# Patient Record
Sex: MALE | Race: WHITE | Employment: STUDENT | URBAN - METROPOLITAN AREA
[De-identification: names, ages, dates, MRNs, and addresses within clinical notes are randomized per-mention and may not be internally consistent; named-entity substitution may affect disease eponyms.]

---
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
## 2025-02-04 ENCOUNTER — OFFICE VISIT (OUTPATIENT)
Dept: INTERNAL MEDICINE CLINIC | Facility: CLINIC | Age: 20
End: 2025-02-04
Payer: COMMERCIAL

## 2025-02-04 VITALS
HEART RATE: 92 BPM | OXYGEN SATURATION: 100 % | BODY MASS INDEX: 19.77 KG/M2 | DIASTOLIC BLOOD PRESSURE: 87 MMHG | HEIGHT: 73 IN | WEIGHT: 149.19 LBS | SYSTOLIC BLOOD PRESSURE: 144 MMHG

## 2025-02-04 DIAGNOSIS — F43.10 PTSD (POST-TRAUMATIC STRESS DISORDER): ICD-10-CM

## 2025-02-04 DIAGNOSIS — Z00.00 ANNUAL PHYSICAL EXAM: Primary | ICD-10-CM

## 2025-02-04 DIAGNOSIS — L70.0 ACNE VULGARIS: ICD-10-CM

## 2025-02-04 DIAGNOSIS — F90.2 ATTENTION DEFICIT HYPERACTIVITY DISORDER (ADHD), COMBINED TYPE: ICD-10-CM

## 2025-02-04 PROCEDURE — 99385 PREV VISIT NEW AGE 18-39: CPT | Performed by: INTERNAL MEDICINE
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
## 2025-02-04 NOTE — PROGRESS NOTES
The following individual(s) verbally consented to be recorded using ambient AI listening technology and understand that they can each withdraw their consent to this listening technology at any point by asking the clinician to turn off or pause the recording: yes

## 2025-02-04 NOTE — PATIENT INSTRUCTIONS
Prevention Guidelines, Men Ages 18 to 39  Screening tests and vaccines are an important part of managing your health. A screening test is done to find possible disorders or diseases in people who don't have any symptoms. The goal is to find a disease early so lifestyle changes can be made and you can be watched more closely to reduce the risk of disease, or to detect it early enough to treat it most effectively. Screening tests are not considered diagnostic, but are used to determine if more testing is needed. Health counseling is essential, too. Below are guidelines for these, for men ages 18 to 39. Talk with your healthcare provider to make sure you’re up-to-date on what you need.  Screening Who needs it How often   Alcohol misuse All men in this age group At routine exams   Blood pressure All men in this age group Yearly checkup if your blood pressure is normal  Normal blood pressure is less than 120/80  If your blood pressure is higher than normal, follow the advice of your healthcare provider.     All men in this age group At routine exams   Diabetes mellitus, type 2 All men who have no symptoms but are overweight or obese and have 1 or more other risk factors for diabetes At least every 3 years (yearly if blood sugar has already started to rise)   Diabetes mellitus, type 2  All men with prediabetes Every year   Hepatitis C If at increased risk At routine exams   High cholesterol or triglycerides All men ages 35 and older, and younger men at high risk for coronary artery disease At least every 5 years   HIV All men At routine exams   Obesity All men in this age group At routine exams   Syphilis Men at increased risk for infection - talk with your healthcare provider At routine exams   Tuberculosis Men at increased risk for infection - talk with your healthcare provider Check with your healthcare provider   Vision All men in this age group Every 5 to 10 years if no risk factors for eye disease   Vaccines Who  needs it How often   Chickenpox (varicella) All men in this age group who have no record of this infection or vaccine 2 doses; the second dose should be given at least 4 weeks after the first dose   Hepatitis A Men at increased risk for infection - talk with your healthcare provider 2 doses given at least 6 months apart   Hepatitis B Men at increased risk for infection - talk with your healthcare provider 3 doses over 6 months; second dose should be given 1 month after the first dose; the third dose should be given at least 2 months after the second dose and at least 4 months after the first dose   Haemophilus influenzae Type B (HIB) Men at increased risk for infection - talk with your healthcare provider 1 to 3 doses   Human papillomavirus (HPV) All men in this age group up to age 26 3 doses; the second dose should be given 1 to 2 months after the first dose and the third dose given 6 months after the first dose   Influenza (flu) All men in this age group Once a year   Measles, mumps, rubella (MMR) All men in this age group who have no record of these infections or vaccines 1 or 2 doses through age 55   Meningococcal Men at increased risk for infection - talk with your healthcare provider 1 or more doses   Pneumococca (PCV13) and Pneumococcal (PPSV23) Men at increased risk for infection - talk with your healthcare provider PCV13: 1 dose ages 19 to 65 (protects against 13 types of pneumococcal bacteria)  PPSV23: 1 to 2 doses through age 64, or 1 dose at 65 or older (protects against 23 types of pneumococcal bacteria)   Tetanus/diphtheria/pertussis (Td/Tdap) booster All men in this age group A one-time Tdap booster after age 18, then Td every10 years   Counseling Who needs it How often   Diet and exercise Overweight or obese people When diagnosed, and then at routine exams   Use of tobacco and the health effects it can cause All men in this age group Every visit   Sexually transmitted infection prevention Men who are  sexually active At routine exams   Skin cancer Prevention of skin cancer in fair-skinned adults through age 24 At routine exams   1Those who are 18 years of age, who are not up-to-date on their childhood immunizations, should receive all appropriate catch-up vaccines recommended by the CDC.  Date Last Reviewed: 2/1/2017  © 6093-1647 [employer]. [address] [place] [address]. All rights reserved. This information is not intended as a substitute for professional medical care. Always follow your healthcare professional's instructions.

## 2025-03-14 ENCOUNTER — LAB ENCOUNTER (OUTPATIENT)
Dept: LAB | Age: 20
End: 2025-03-14
Attending: INTERNAL MEDICINE
Payer: COMMERCIAL

## 2025-03-14 ENCOUNTER — TELEPHONE (OUTPATIENT)
Dept: INTERNAL MEDICINE CLINIC | Facility: CLINIC | Age: 20
End: 2025-03-14

## 2025-03-14 DIAGNOSIS — Z23 NEED FOR MENINGOCOCCAL VACCINATION: Primary | ICD-10-CM

## 2025-03-14 DIAGNOSIS — Z00.00 ANNUAL PHYSICAL EXAM: ICD-10-CM

## 2025-03-14 LAB
ALBUMIN SERPL-MCNC: 5 G/DL (ref 3.2–4.8)
ALBUMIN/GLOB SERPL: 1.9 {RATIO} (ref 1–2)
ALP LIVER SERPL-CCNC: 66 U/L
ALT SERPL-CCNC: 21 U/L
ANION GAP SERPL CALC-SCNC: 8 MMOL/L (ref 0–18)
AST SERPL-CCNC: 28 U/L (ref ?–34)
BASOPHILS # BLD AUTO: 0.02 X10(3) UL (ref 0–0.2)
BASOPHILS NFR BLD AUTO: 0.6 %
BILIRUB SERPL-MCNC: 0.7 MG/DL (ref 0.3–1.2)
BUN BLD-MCNC: 13 MG/DL (ref 9–23)
BUN/CREAT SERPL: 13.3 (ref 10–20)
CALCIUM BLD-MCNC: 9.9 MG/DL (ref 8.7–10.4)
CHLORIDE SERPL-SCNC: 103 MMOL/L (ref 98–112)
CHOLEST SERPL-MCNC: 117 MG/DL (ref ?–200)
CO2 SERPL-SCNC: 27 MMOL/L (ref 21–32)
CREAT BLD-MCNC: 0.98 MG/DL
DEPRECATED RDW RBC AUTO: 34 FL (ref 35.1–46.3)
EGFRCR SERPLBLD CKD-EPI 2021: 114 ML/MIN/1.73M2 (ref 60–?)
EOSINOPHIL # BLD AUTO: 0.06 X10(3) UL (ref 0–0.7)
EOSINOPHIL NFR BLD AUTO: 1.7 %
ERYTHROCYTE [DISTWIDTH] IN BLOOD BY AUTOMATED COUNT: 11.8 % (ref 11–15)
FASTING PATIENT LIPID ANSWER: YES
FASTING STATUS PATIENT QL REPORTED: YES
GLOBULIN PLAS-MCNC: 2.6 G/DL (ref 2–3.5)
GLUCOSE BLD-MCNC: 107 MG/DL (ref 70–99)
HCT VFR BLD AUTO: 41.8 %
HDLC SERPL-MCNC: 43 MG/DL (ref 40–59)
HGB BLD-MCNC: 14.8 G/DL
IMM GRANULOCYTES # BLD AUTO: 0.01 X10(3) UL (ref 0–1)
IMM GRANULOCYTES NFR BLD: 0.3 %
LDLC SERPL CALC-MCNC: 59 MG/DL (ref ?–100)
LYMPHOCYTES # BLD AUTO: 1.35 X10(3) UL (ref 1.5–5)
LYMPHOCYTES NFR BLD AUTO: 37.4 %
MCH RBC QN AUTO: 28.7 PG (ref 26–34)
MCHC RBC AUTO-ENTMCNC: 35.4 G/DL (ref 31–37)
MCV RBC AUTO: 81 FL
MONOCYTES # BLD AUTO: 0.64 X10(3) UL (ref 0.1–1)
MONOCYTES NFR BLD AUTO: 17.7 %
NEUTROPHILS # BLD AUTO: 1.53 X10 (3) UL (ref 1.5–7.7)
NEUTROPHILS # BLD AUTO: 1.53 X10(3) UL (ref 1.5–7.7)
NEUTROPHILS NFR BLD AUTO: 42.3 %
NONHDLC SERPL-MCNC: 74 MG/DL (ref ?–130)
OSMOLALITY SERPL CALC.SUM OF ELEC: 287 MOSM/KG (ref 275–295)
PLATELET # BLD AUTO: 215 10(3)UL (ref 150–450)
POTASSIUM SERPL-SCNC: 4 MMOL/L (ref 3.5–5.1)
PROT SERPL-MCNC: 7.6 G/DL (ref 5.7–8.2)
RBC # BLD AUTO: 5.16 X10(6)UL
SODIUM SERPL-SCNC: 138 MMOL/L (ref 136–145)
TRIGL SERPL-MCNC: 75 MG/DL (ref 30–149)
TSI SER-ACNC: 1.86 UIU/ML (ref 0.48–4.17)
VLDLC SERPL CALC-MCNC: 11 MG/DL (ref 0–30)
WBC # BLD AUTO: 3.6 X10(3) UL (ref 4–11)

## 2025-03-14 PROCEDURE — 80053 COMPREHEN METABOLIC PANEL: CPT

## 2025-03-14 PROCEDURE — 36415 COLL VENOUS BLD VENIPUNCTURE: CPT

## 2025-03-14 PROCEDURE — 80061 LIPID PANEL: CPT

## 2025-03-14 PROCEDURE — 85025 COMPLETE CBC W/AUTO DIFF WBC: CPT

## 2025-03-14 PROCEDURE — 84443 ASSAY THYROID STIM HORMONE: CPT

## 2025-03-14 NOTE — TELEPHONE ENCOUNTER
Patient walked into the [facility] office states he received a message he is due for his pneumococcal vaccine    No orders in chart     Please call patient with status

## 2025-04-13 ENCOUNTER — PATIENT MESSAGE (OUTPATIENT)
Dept: INTERNAL MEDICINE CLINIC | Facility: CLINIC | Age: 20
End: 2025-04-13

## 2025-04-18 ENCOUNTER — NURSE ONLY (OUTPATIENT)
Dept: INTERNAL MEDICINE CLINIC | Facility: CLINIC | Age: 20
End: 2025-04-18

## 2025-04-18 DIAGNOSIS — Z23 NEED FOR MENINGOCOCCAL VACCINATION: Primary | ICD-10-CM

## 2025-04-18 PROCEDURE — 90471 IMMUNIZATION ADMIN: CPT | Performed by: INTERNAL MEDICINE

## 2025-04-18 PROCEDURE — 90620 MENB-4C VACCINE IM: CPT | Performed by: INTERNAL MEDICINE

## 2025-04-18 NOTE — PROGRESS NOTES
Pt presented for nurse visit for Meningococcal vaccine, confirmed orders, confirmed pts name and , administered vaccine on left deltoid, pt had no adverse reactions, provided vaccine information sheet to pt, let pt know to call us if he had any questions or concerns, pt verbalized understanding

## 2025-08-12 ENCOUNTER — NURSE TRIAGE (OUTPATIENT)
Dept: INTERNAL MEDICINE CLINIC | Facility: CLINIC | Age: 20
End: 2025-08-12

## (undated) NOTE — LETTER
No referring provider defined for this encounter. 04/19/19        Patient: Stevenson Giles   YOB: 2005   Date of Visit: 4/19/2019       Dear  Dr. Chris Brice, DO,      Thank you for referring Stevenson Giles to my practice.   Please find m

## (undated) NOTE — LETTER
[facility]                                      Department of Human Services                                   Certificate of Child Health Examination       Student's Name  Dillon Salazar Birth Date  10/30/2005  Sex  Male Race/Ethnicity   School/Grade Level/ID#      Address  [address] [place] [address] [place] [facility] [address] Parent/Guardian      Telephone# - Home   Telephone# - Work                              IMMUNIZATIONS:  To be completed by health care provider.  The mo/da/yr for every dose administered is required.  If a specific vaccine is medically contraindicated, a separate written statement must be attached by the health care provider responsible for completing the health examination explaining the medical reason for the contradiction.   VACCINE/DOSE DATE DATE DATE DATE DATE   Diphtheria, Tetanus and Pertussis (DTP or DTap) 1/11/2006 3/1/2006 5/1/2006 2/8/2007 10/28/2010   Tdap 6/15/2017       Td        Pediatric DT        Inactivate Polio (IPV) 1/11/2006 1/24/2006 3/1/2006 10/28/2010    Oral Polio (OPV)        Haemophilus Influenza Type B (Hib) 1/11/2006 3/1/2006 5/1/2006     Hepatitis B (HB) 10/31/2005 12/7/2005 7/24/2006     Varicella (Chickenpox) 10/30/2006 6/20/2011      Combined Measles, Mumps and Rubella (MMR) 10/30/2006 6/20/2011      Measles (Rubeola)        Rubella (3-day measles)        Mumps        Pneumococcal 1/11/2006 3/1/2006 5/1/2006 2/8/2007    Meningococcal Conjugate 6/15/2017 1/12/2022         RECOMMENDED, BUT NOT REQUIRED  Vaccine/Dose        VACCINE/DOSE DATE DATE DATE DATE DATE DATE   Hepatitis A 10/29/2007 6/20/2011       HPV 6/15/2017 1/19/2018       Influenza 10/27/2017 10/29/2018 11/4/2019 12/9/2020 1/12/2022 1/11/2023   Men B 1/10/2024        Covid 5/18/2021 6/8/2021 1/16/2022         Other:  Specify Immunization/Adminstered Dates:   Health care provider (MD, DO, APN, PA , school health professional) verifying above immunization  history must sign below.  Signature   ***                                                                                                                                     Title                           Date  1/10/2024   Signature                                                                                                                                              Title                           Date    (If adding dates to the above immunization history section, put your initials by date(s) and sign here.)   ALTERNATIVE PROOF OF IMMUNITY   1.Clinical diagnosis (measles, mumps, hepatits B) is allowed when verified by physician & supported with lab confirmation. Attach copy of lab result.       *MEASLES (Rubeola)  MO/DA/YR        * MUMPS MO/DA/YR       HEPATITIS B   MO/DA/YR        VARICELLA MO/DA/YR           2.  History of varicella (chickenpox) disease is acceptable if verified by health care provider, school health professional, or health official.       Person signing below is verifying  parent/guardian’s description of varicella disease is indicative of past infection and is accepting such hx as documentation of disease.       Date of Disease                                  Signature                                                                         Title                           Date             3.  Lab Evidence of Immunity (check one)    __Measles*       __Mumps *       __Rubella        __Varicella      __Hepatitis B       *Measles diagnosed on/after 7/1/2002 AND mumps diagnosed on/after 7/1/2013 must be confirmed by laboratory evidence   Completion of Alternatives 1 or 3 MUST be accompanied by Labs & Physician Signature:  Physician Statements of Immunity MUST be submitted to [facility] for review.   Certificates of [employer] Exemption to Immunizations or Physician Medical Statements of Medical Contraindication are Reviewed and Maintained by the School Authority.           Student's Name  Marie  Dillon BALTAZAR Birth Date  10/30/2005  Sex  Male School   Grade Level/ID#  ***   HEALTH HISTORY          TO BE COMPLETED AND SIGNED BY PARENT/GUARDIAN AND VERIFIED BY HEALTH CARE PROVIDER    ALLERGIES  (Food, drug, insect, other)  Patient has no known allergies. MEDICATION  (List all prescribed or taken on a regular basis.)     Diagnosis of asthma?  Child wakes during the night coughing   Yes   No    Yes   No    Loss of function of one of paired organs? (eye/ear/kidney/testicle)   Yes   No      Birth Defects?  Developmental delay?   Yes   No    Yes   No  Hospitalizations?  When?  What for?   Yes   No    Blood disorders?  Hemophilia, Sickle Cell, Other?  Explain.   Yes   No  Surgery?  (List all.)  When?  What for?   Yes   No    Diabetes?   Yes   No  Serious injury or illness?   Yes   No    Head Injury/Concussion/Passed out?   Yes   No  TB skin text positive (past/present)?   Yes   No *If yes, refer to local    Seizures?  What are they like?   Yes   No  TB disease (past or present)?   Yes   No *health department   Heart problem/Shortness of breath?   Yes   No  Tobacco use (type, frequency)?   Yes   No    Heart murmur/High blood pressure?   Yes   No  Alcohol/Drug use?   Yes   No    Dizziness or chest pain with exercise?   Yes   No  Fam hx sudden death < age 50 (Cause?)    Yes   No    Eye/Vision problems?  Yes  No   Glasses  Yes   No  Contacts  Yes    No   Last eye exam___  Other concerns? (crossed eye, drooping lids, squinting, difficulty reading) Dental:  ____Braces    ____Bridge    ____Plate    ____Other  Other concerns?     Ear/Hearing problems?   Yes   No  Information may be shared with appropriate personnel for health /educational purposes.   Bone/Joint problem/injury/scoliosis?   Yes   No  Parent/Guardian Signature                                          Date     PHYSICAL EXAMINATION REQUIREMENTS    Entire section below to be completed by MD/DO/APN/PA       PHYSICAL EXAMINATION REQUIREMENTS (head circumference if <2-3  years old):   There were no vitals taken for this visit.    DIABETES SCREENING  BMI>85% age/sex  No And any two of the following:  Family History No    Ethnic Minority  No          Signs of Insulin Resistance (hypertension, dyslipidemia, polycystic ovarian syndrome, acanthosis nigricans)    No           At Risk  No   Lead Risk Questionnaire  Req'd for children 6 months thru 6 yrs enrolled in licensed or public school operated day care, ,  nursery school and/or  (blood test req’d if resides in [facility] or high risk zip)   Questionnaire Administered:Yes   Blood Test Indicated:No   Blood Test Date                 Result:                 TB Skin OR Blood Test   Rec.only for children in high-risk groups incl. children immunosuppressed due to HIV infection or other conditions, frequent travel to or born in high prevalence countries or those exposed to adults in high-risk categories.  See CDCguidelines.  http://www.cdc.gov/tb/publications/factsheets/testing/TB_testing.htm.      No Test Needed        Skin Test:     Date Read                  /      /              Result:                     mm    ______________                         Blood Test:   Date Reported          /      /              Result:                  Value ______________               LAB TESTS (Recommended) Date Results  Date Results   Hemoglobin or Hematocrit   Sickle Cell  (when indicated)     Urinalysis   Developmental Screening Tool     SYSTEM REVIEW Normal Comments/Follow-up/Needs  Normal Comments/Follow-up/Needs   Skin Yes  Endocrine Yes    Ears Yes                      Screen result: Gastrointestinal Yes    Eyes Yes     Screen result:   Genito-Urinary Yes  LMP   Nose Yes  Neurological Yes    Throat Yes  Musculoskeletal Yes    Mouth/Dental Yes  Spinal examination Yes    Cardiovascular/HTN Yes  Nutritional status Yes    Respiratory Yes                   Diagnosis of Asthma: No Mental Health Yes        Currently Prescribed Asthma  Medication:            Quick-relief  medication (e.g. Short Acting Beta Antagonist): No          Controller medication (e.g. inhaled corticosteroid):   No Other   NEEDS/MODIFICATIONS required in the school setting  None DIETARY Needs/Restrictions     None   SPECIAL INSTRUCTIONS/DEVICES e.g. safety glasses, glass eye, chest protector for arrhythmia, pacemaker, prosthetic device, dental bridge, false teeth, athleticsupport/cup     None   MENTAL HEALTH/OTHER   Is there anything else the school should know about this student?  No  If you would like to discuss this student's health with school or school health professional, check title:  __Nurse  __Teacher  __Counselor  __Principal   EMERGENCY ACTION  needed while at school due to child's health condition (e.g., seizures, asthma, insect sting, food, peanut allergy, bleeding problem, diabetes, heart problem)?  No  If yes, please describe.     On the basis of the examination on this day, I approve this child's participation in        (If No or Modified, please attach explanation.)  PHYSICAL EDUCATION    Yes      INTERSCHOLASTIC SPORTS   Yes   Physician/Advanced Practice Nurse/Physician Assistant performing examination  Print Name  Andres Roberts MD                                            Signature   ***                                                                                    Date  1/10/2024     Address/Phone  [facility] [address]  [phone]   Rev 11/15                                                                    Printed by the Authority of the [facility]

## (undated) NOTE — LETTER
[facility] [employer] of Child Health Examination       Student's Name  Mikaela Jaimes Birth [birth year] Signature                                 . .                                                                                   Title                           Date  11/4/2019   Signature Grade Level/ID#  9th Grade   HEALTH HISTORY          TO BE COMPLETED AND SIGNED BY PARENT/GUARDIAN AND VERIFIED BY HEALTH CARE PROVIDER    ALLERGIES  (Food, drug, insect, other)  Patient has no known allergies.  MEDICATION  (List all prescribed or taken on by MD/DO/APN/PA       PHYSICAL EXAMINATION REQUIREMENTS (head circumference if <33 years old):   /67   Pulse 71   Ht 5' 4.5\" (1.638 m)   Wt 45.4 kg (100 lb 2 oz)   BMI 16.92 kg/m²     DIABETES SCREENING  BMI>85% age/sex  No And any two of the follo Cardiovascular/HTN Yes  Nutritional status Yes    Respiratory Yes                   Diagnosis of Asthma: No Mental Health Yes        Currently Prescribed Asthma Medication:            Quick-relief  medication (e.g. Short Acting Beta Antagonist):  No

## (undated) NOTE — LETTER
[facility] [employer] of Child Health Examination       Student's Name  Elizabeth Tucker Birth [birth year] Carlos Signature                                        . .                                                                                             Title      MD                     Date  12/09/2020   Signature 10/30/2005  Sex  Male School   Grade Level/ID#  9th Grade   HEALTH HISTORY          TO BE COMPLETED AND SIGNED BY PARENT/GUARDIAN AND VERIFIED BY HEALTH CARE PROVIDER    ALLERGIES  (Food, drug, insect, other)  Patient has no known allergies.  MEDICATION  (L PHYSICAL EXAMINATION REQUIREMENTS (head circumference if <33 years old):   /74   Pulse 87   Ht 5' 8.5\"   Wt 55.5 kg (122 lb 6.4 oz)   BMI 18.34 kg/m²     DIABETES SCREENING  BMI>85% age/sex  No And any two of the following:  Family History No    Et Respiratory Yes                   Diagnosis of Asthma: No Mental Health Yes        Currently Prescribed Asthma Medication:            Quick-relief  medication (e.g. Short Acting Beta Antagonist): No          Controller medication (e.g. inhaled corticostero

## (undated) NOTE — LETTER
[facility] [employer] Child [employer] Examination       Student's Name  Mana King Birth [birth year] Carlos Signature                                                                                                                                   Title                           Date        10/29/2018   Signature 10/30/2005  Sex  Male School   Grade Level/ID#  7th Grade   HEALTH HISTORY          TO BE COMPLETED AND SIGNED BY PARENT/GUARDIAN AND VERIFIED BY HEALTH CARE PROVIDER    ALLERGIES  (Food, drug, insect, other)  Patient has no known allergies.  MEDICATION  (L /60   Ht 5' 1.25\" (1.556 m)   Wt 39.5 kg (87 lb)   BMI 16.30 kg/m²     DIABETES SCREENING  BMI>85% age/sex  No And any two of the following:  Family History No    Ethnic Minority  No          Signs of Insulin Resistance (hypertension, dyslipidemia, Currently Prescribed Asthma Medication:            Quick-relief  medication (e.g. Short Acting Beta Antagonist): No          Controller medication (e.g. inhaled corticosteroid):   No Other   NEEDS/MODIFICATIONS required in the school setting  None DIET

## (undated) NOTE — Clinical Note
[facility] [employer] Examination       Student's Name  Hermilo Cottrell Birth Date Title                           Date    (If adding dates to the above immunization history section, put your initials by date(s) and sign here.)   ALTERNATIVE PROOF OF IMMUNITY   1 Diagnosis of asthma? Child wakes during the night coughing   Yes   No    Yes   No    Loss of function of one of paired organs? (eye/ear/kidney/testicle)   Yes   No      Birth Defects? Developmental delay? Yes   No    Yes   No  Hospitalizations? When? Signs of Insulin Resistance (hypertension, dyslipidemia, polycystic ovarian syndrome, acanthosis nigricans)     no                      At Risk  no   Lead Risk Questionnaire  Req'd for children 6 months thru 6 yrs enrolled in licensed or public [facility] Needs/Restrictions     None   SPECIAL INSTRUCTIONS/DEVICES e.g. safety glasses, glass eye, chest protector for arrhythmia, pacemaker, prosthetic device, dental bridge, false teeth, athleticsupport/cup     None   MENTAL HEALTH/OTHER   Is there anything else

## (undated) NOTE — Clinical Note
VACCINE ADMINISTRATION RECORD  PARENT / GUARDIAN APPROVAL  Date: 6/15/2017  Vaccine administered to: Camilo Silveira     : 10/30/2005    MRN: [id number]    A copy of the appropriate Centers for Disease Control and Prevention Vaccine Information statement

## (undated) NOTE — LETTER
VACCINE ADMINISTRATION RECORD  PARENT / GUARDIAN APPROVAL  Date: 2022  Vaccine administered to: Antoni Elliott     : 10/30/2005    MRN: [id number]    A copy of the appropriate Centers for Disease Control and Prevention Vaccine Information statement

## (undated) NOTE — LETTER
[facility] [employer] Child [employer] Examination       Student's Name  Dimple Sheth Birth Date Signature                                                                                                                                   Title                           Date     Signature Grade Level/ID#  6th Grade   HEALTH HISTORY          TO BE COMPLETED AND SIGNED BY PARENT/GUARDIAN AND VERIFIED BY HEALTH CARE PROVIDER    ALLERGIES  (Food, drug, insect, other)  Review of patient's allergies indicates no known allergies.  MEDICATION  [facility] Date     PHYSICAL EXAMINATION REQUIREMENTS    Entire section below to be completed by MD//APN/PA       PHYSICAL EXAMINATION REQUIREMENTS (head circumference if <33 years old):   /66   Pulse 71   Resp 18   H Nose Yes  Neurological Yes    Throat Yes  Musculoskeletal Yes    Mouth/Dental Yes  Spinal examination Yes    Cardiovascular/HTN Yes  Nutritional status Yes    Respiratory Yes                   Diagnosis of Asthma: No Mental Health Yes ADHD       Currently

## (undated) NOTE — MR AVS SNAPSHOT
[facility] SPECIALTY [facility] [place] [address] [address] [id number]  [phone]               Thank you for choosing us for your health care visit with Frank Hess MD.  We are glad to serve you and happy to provide you with this summary of y baby begins eating solid foods, introduce nutritious foods early on and often. Sometimes toddlers need to try a food 10 times before they actually accept and enjoy it. It is also important to encourage play time as soon as they start crawling and walking.

## (undated) NOTE — LETTER
VACCINE ADMINISTRATION RECORD  PARENT / GUARDIAN APPROVAL  Date: 1/10/2024  Vaccine administered to: Dillon Salazar     : 10/30/2005    MRN: [id number]    A copy of the appropriate Centers for Disease Control and Prevention Vaccine Information statement has been provided. I have read or have had explained the information about the diseases and the vaccines listed below. There was an opportunity to ask questions and any questions were answered satisfactorily. I believe that I understand the benefits and risks of the vaccine cited and ask that the vaccine(s) listed below be given to me or to the person named above (for whom I am authorized to make this request).    VACCINES ADMINISTERED:  MEN B #1    I have read and hereby agree to be bound by the terms of this agreement as stated above. My signature is valid until revoked by me in writing.  This document is signed by , relationship: Self on 1/10/2024.:                                                                                              1/10/2024            Parent / Guardian Signature                                                Date    Layla Escamilla served as a witness to authentication that the identity of the person signing electronically is in fact the person represented as signing.

## (undated) NOTE — LETTER
Name:  Elizabeth Tucker School Year:  6th Grade Class: Student ID No.:   Address:  [place] [address] Phone:  [phone] (home) [phone] (work) :  6year old   Name Relationship [address] Work LifeShield Phon syndrome, short QT syndrome, Brugada syndrome, or catecholaminergic polymorphic ventricular tachycardia? 13. Does anyone in your family have a heart problem, pacemaker, or implanted defibrillator?      12. Has anyone in your family had unexplained faint 37. Do you have headaches with exercise? 38. Have you ever had numbness, tingling, or weakness in your arms or legs after being hit or falling? 39.Have you ever been unable to move your arms / legs after being hit /fall? 40.  Have you ever becom MVP, aortic insufficiency) Yes    Eyes/Ears/Nose/Throat:  Pupils equal    Hearing Yes    Lymph nodes Yes    Heart*  · Murmurs (auscultation standing, supine, +/- Valsalva)  · Location of point of maximal impulse (PMI) Yes    Pulses Yes    Lungs Yes    Abdo defined in the [facility] Performance-Enhancing Substance Testing Program Protocol.  We have reviewed the policy and understand that I/our student may be asked to submit to testing for the presence of performance-enhancing substances in my/his/her body either dur

## (undated) NOTE — LETTER
VACCINE ADMINISTRATION RECORD  PARENT / GUARDIAN APPROVAL  Date: 2018  Vaccine administered to: Yanely Valentine     : 10/30/2005    MRN: [id number]    A copy of the appropriate Centers for Disease Control and Prevention Vaccine Information statement

## (undated) NOTE — MR AVS SNAPSHOT
[place]  [address]  [phone]               Thank you for choosing us for your health care visit with Adam Frye MD.  We are glad to serve you and happy to provide you with this summa Commonly known as:  ADDERALL XR           * Amphetamine-Dextroamphet ER 20 MG Cp24   Take 1 capsule (20 mg total) by mouth daily. What changed: You were already taking a medication with the same name, and this prescription was added.  Make sure you under

## (undated) NOTE — LETTER
[facility] [employer] Child [employer] Examination       Student's Name  Frankie Avina Birth Juan Carlos below.  Signature   . Surekha Bagley         Title                           Date  1/12/2022   Signature                                                                                                                                              Title VERIFIED BY HEALTH CARE PROVIDER    ALLERGIES  (Food, drug, insect, other)  Patient has no known allergies. MEDICATION  (List all prescribed or taken on a regular basis.)     Diagnosis of asthma?   Child wakes during the night coughing   Yes   No    Yes   N Ethnic Minority  No          Signs of Insulin Resistance (hypertension, dyslipidemia, polycystic ovarian syndrome, acanthosis nigricans)    No           At Risk  No   Lead Risk Questionnaire  Req'd for children 6 months thru 6 yrs enrolled in licensed o Other   NEEDS/MODIFICATIONS required in the school setting  None DIETARY Needs/Restrictions     None   SPECIAL INSTRUCTIONS/DEVICES e.g. safety glasses, glass eye, chest protector for arrhythmia, pacemaker, prosthetic device, dental bridge, false teeth, at